# Patient Record
Sex: FEMALE | Race: WHITE | Employment: FULL TIME
[De-identification: names, ages, dates, MRNs, and addresses within clinical notes are randomized per-mention and may not be internally consistent; named-entity substitution may affect disease eponyms.]

---

## 2017-06-24 ENCOUNTER — HEALTH MAINTENANCE LETTER (OUTPATIENT)
Age: 37
End: 2017-06-24

## 2018-01-04 ENCOUNTER — TRANSFERRED RECORDS (OUTPATIENT)
Dept: HEALTH INFORMATION MANAGEMENT | Facility: CLINIC | Age: 38
End: 2018-01-04

## 2018-05-15 ENCOUNTER — TRANSFERRED RECORDS (OUTPATIENT)
Dept: HEALTH INFORMATION MANAGEMENT | Facility: CLINIC | Age: 38
End: 2018-05-15

## 2019-01-28 ENCOUNTER — OFFICE VISIT (OUTPATIENT)
Dept: FAMILY MEDICINE | Facility: CLINIC | Age: 39
End: 2019-01-28
Payer: COMMERCIAL

## 2019-01-28 VITALS
TEMPERATURE: 97 F | OXYGEN SATURATION: 98 % | BODY MASS INDEX: 26.29 KG/M2 | HEIGHT: 64 IN | SYSTOLIC BLOOD PRESSURE: 124 MMHG | WEIGHT: 154 LBS | HEART RATE: 69 BPM | DIASTOLIC BLOOD PRESSURE: 80 MMHG

## 2019-01-28 DIAGNOSIS — F10.11 HISTORY OF ALCOHOL ABUSE: ICD-10-CM

## 2019-01-28 DIAGNOSIS — G47.33 OSA (OBSTRUCTIVE SLEEP APNEA): ICD-10-CM

## 2019-01-28 DIAGNOSIS — N20.0 KIDNEY STONE: ICD-10-CM

## 2019-01-28 DIAGNOSIS — G43.109 MIGRAINE WITH AURA AND WITHOUT STATUS MIGRAINOSUS, NOT INTRACTABLE: ICD-10-CM

## 2019-01-28 DIAGNOSIS — N83.209 CYST OF OVARY, UNSPECIFIED LATERALITY: ICD-10-CM

## 2019-01-28 DIAGNOSIS — Z76.89 ENCOUNTER TO ESTABLISH CARE WITH NEW DOCTOR: Primary | ICD-10-CM

## 2019-01-28 DIAGNOSIS — G47.419 PRIMARY NARCOLEPSY WITHOUT CATAPLEXY: ICD-10-CM

## 2019-01-28 PROCEDURE — 99203 OFFICE O/P NEW LOW 30 MIN: CPT | Performed by: INTERNAL MEDICINE

## 2019-01-28 RX ORDER — DIHYDROERGOTAMINE MESYLATE 1 MG/ML
.5-1 INJECTION, SOLUTION INTRAMUSCULAR; INTRAVENOUS; SUBCUTANEOUS
COMMUNITY
Start: 2014-11-24

## 2019-01-28 RX ORDER — METHYLPHENIDATE HYDROCHLORIDE 10 MG/1
10 TABLET ORAL 2 TIMES DAILY
COMMUNITY
End: 2019-01-28

## 2019-01-28 RX ORDER — KETOCONAZOLE 20 MG/ML
SHAMPOO TOPICAL
COMMUNITY
Start: 2016-09-27

## 2019-01-28 RX ORDER — METHYLPHENIDATE HYDROCHLORIDE 10 MG/1
TABLET ORAL
Qty: 128 TABLET | Refills: 0 | Status: SHIPPED | OUTPATIENT
Start: 2019-01-28 | End: 2019-02-27

## 2019-01-28 RX ORDER — DEXTROAMPHETAMINE SACCHARATE, AMPHETAMINE ASPARTATE MONOHYDRATE, DEXTROAMPHETAMINE SULFATE AND AMPHETAMINE SULFATE 5; 5; 5; 5 MG/1; MG/1; MG/1; MG/1
20 CAPSULE, EXTENDED RELEASE ORAL
COMMUNITY
Start: 2018-12-15 | End: 2019-01-28

## 2019-01-28 RX ORDER — HYDROCORTISONE 2.5 %
CREAM (GRAM) TOPICAL
COMMUNITY
Start: 2016-09-27

## 2019-01-28 RX ORDER — DEXTROAMPHETAMINE SACCHARATE, AMPHETAMINE ASPARTATE MONOHYDRATE, DEXTROAMPHETAMINE SULFATE AND AMPHETAMINE SULFATE 5; 5; 5; 5 MG/1; MG/1; MG/1; MG/1
20 CAPSULE, EXTENDED RELEASE ORAL EVERY MORNING
Qty: 30 CAPSULE | Refills: 0 | Status: SHIPPED | OUTPATIENT
Start: 2019-01-28 | End: 2019-02-27

## 2019-01-28 RX ORDER — ONDANSETRON 4 MG/1
4 TABLET, ORALLY DISINTEGRATING ORAL
COMMUNITY
Start: 2018-12-21

## 2019-01-28 RX ORDER — FLUOCINONIDE 0.5 MG/G
CREAM TOPICAL
COMMUNITY
Start: 2016-03-10

## 2019-01-28 ASSESSMENT — MIFFLIN-ST. JEOR: SCORE: 1348.54

## 2019-01-28 NOTE — PROGRESS NOTES
"  SUBJECTIVE:   Destini Garcia is a 39 year old female who presents to clinic today for the following health issues:    Destini is here to establish care.  She has been following at [facility] system for years, but needs to transfer due to change of insurance.  Her main concerns today are refills of her stimulants for narcolepsy and referrals.    Narcolepsy without cataplexy -she is taking Adderall X are 20 mg daily along with Ritalin 10 mg 4 times daily and occasionally as needed.  She is followed with pulmonology for her narcolepsy.    GERONIMO -mild, does not use a CPAP anymore.  Also has followed with pulmonology for this.    Migraine -has not seen her neurologist in quite some time.  In the past she has used ergotamine and Toradol injections and Zofran.  Needs new referral.    Kidney stones -was seen recently in the ER and diagnosed with kidney stones.  She did pass this and has it at home, needs to bring it in for analysis.  She has had similar symptoms to those that brought her to the ER, but had never been officially diagnosed with kidney stones in the past.    Ovarian cyst -imaging in the ER revealed an ovarian cyst, she would like a referral to OB/GYN.  She is also overdue for Pap smear.     Mental health -history of anxiety and bipolar 2 disorder.  She is not currently on any specific medications for those.      SH: lives with her mother.  Works at [employer] in [employer].         Reviewed and updated as needed this visit by clinical staff  Tobacco  Allergies  Meds  Med Hx  Surg Hx  Fam Hx  Soc Hx      Reviewed and updated as needed this visit by Provider  Tobacco  Meds  Med Hx  Surg Hx  Fam Hx  Soc Hx        ROS:  Const, HEENT, pulm, neuro,  reviewed, she reports some left sided ear pain, otherwise negative unless noted above.       OBJECTIVE:     /80   Pulse 69   Temp 97  F (36.1  C)   Ht 1.626 m (5' 4\")   Wt 69.9 kg (154 lb)   SpO2 98%   BMI 26.43 kg/m    Body mass index is 26.43 " kg/m .    Gen: well appearing, pleasant woman, no distress  HEENT: PERRL, sclera nonicteric, ear canals and TM normal b/l. MMM  Pulm: breathing comfortably, CTAB, no wheezes or rales  CV: RRR, normal S1 and S2, no murmurs  Psych: normal affect, linear, appropriate       ASSESSMENT/PLAN:       1. Encounter to establish care with new doctor    2. Primary narcolepsy without cataplexy  Confirmed medication with last pulmonary note in care everywhere.  New referral placed.   - amphetamine-dextroamphetamine (ADDERALL XR) 20 MG 24 hr capsule; Take 1 capsule (20 mg) by mouth every morning  Dispense: 30 capsule; Refill: 0  - methylphenidate (RITALIN) 10 MG tablet; QID and prn  Dispense: 128 tablet; Refill: 0  - PULMONARY MEDICINE REFERRAL    3. Migraine with aura and without status migrainosus, not intractable  Referral placed.   - NEUROLOGY ADULT REFERRAL    4. GERONIMO (obstructive sleep apnea)  - PULMONARY MEDICINE REFERRAL    5. Kidney stone  She can bring the stone to our lab for analysis   - Stone analysis; Future    6. Cyst of ovary, unspecified laterality  - OB/GYN REFERRAL    F/U - 3 months for E     Zahra Cho MD  [facility]

## 2019-02-26 DIAGNOSIS — G47.419 PRIMARY NARCOLEPSY WITHOUT CATAPLEXY: ICD-10-CM

## 2019-02-26 NOTE — TELEPHONE ENCOUNTER
Patient calling in requesting a refill on her amphetamine-dextroamphetamine (ADDERALL XR) 20 MG 24 hr capsule and methylphenidate (RITALIN) 10 MG tablet patient states she will  at the  when ready. PT states she has enough for tomorrow then she is completely out, pt advised of 72 business hours prescriptions could take to be refilled, pt stated she understands. When prescriptions have been signed please call the patient to let her know they will be at the  to be picked up. [phone] okay to leave a detailed vm.      .Zabrina BRANCH    [facility]

## 2019-02-26 NOTE — TELEPHONE ENCOUNTER
I can fill this until she gets an appointment with specialist for narcolepsy, but I don't see that there are any future appointments made.  Does she need any assistance making pulm and neuro appointments?    Zahra Cho MD

## 2019-02-26 NOTE — TELEPHONE ENCOUNTER
Patient returned call- states that she must have misunderstood Dr Cho regarding the appts -  They have not called her yet and she will be out of her medications tomorrow.  Gave patient the number for Pulmonology from the referral on the AVS- advised to call today and get this appointment scheduled.  Tried to give patient the numbers for Neurology and OB-GYN- patient states the only important one is the pulmonologist because that is for Narcolepsy   She will call back with the appointment date and time    [facility] Anurag Carr [phone]

## 2019-02-26 NOTE — TELEPHONE ENCOUNTER
Left a non detailed message for patient to return call.     Mary ROBLERON, RN   [facility]

## 2019-02-26 NOTE — TELEPHONE ENCOUNTER
adderall      Last Written Prescription Date:  1/28/19  Last Fill Quantity: 30,   # refills: 0  Last Office Visit: 1/28/19  Future Office visit:       ritalin      Last Written Prescription Date:  1/28/19  Last Fill Quantity: 128,   # refills: 0  Last Office Visit: 1/28/19  Future Office visit:       Routing refill request to provider for review/approval because:  Drug not on the [facility], UMP or  Health refill protocol or controlled substance    RX [place] reviewed:  reviewed- recommend provider review    [place]  [email or web address]

## 2019-02-27 ENCOUNTER — TELEPHONE (OUTPATIENT)
Dept: FAMILY MEDICINE | Facility: CLINIC | Age: 39
End: 2019-02-27

## 2019-02-27 PROCEDURE — 82365 CALCULUS SPECTROSCOPY: CPT | Mod: 90 | Performed by: INTERNAL MEDICINE

## 2019-02-27 PROCEDURE — 99000 SPECIMEN HANDLING OFFICE-LAB: CPT | Performed by: INTERNAL MEDICINE

## 2019-02-27 RX ORDER — DEXTROAMPHETAMINE SACCHARATE, AMPHETAMINE ASPARTATE MONOHYDRATE, DEXTROAMPHETAMINE SULFATE AND AMPHETAMINE SULFATE 5; 5; 5; 5 MG/1; MG/1; MG/1; MG/1
20 CAPSULE, EXTENDED RELEASE ORAL EVERY MORNING
Qty: 30 CAPSULE | Refills: 0 | Status: SHIPPED | OUTPATIENT
Start: 2019-02-27 | End: 2019-10-29

## 2019-02-27 RX ORDER — METHYLPHENIDATE HYDROCHLORIDE 10 MG/1
TABLET ORAL
Qty: 128 TABLET | Refills: 0 | Status: SHIPPED | OUTPATIENT
Start: 2019-02-27 | End: 2019-10-29

## 2019-02-27 NOTE — TELEPHONE ENCOUNTER
Reason for Call:  Other prescription    Detailed comments:   *PT last day of meds today*    PT needs refill for     amphetamine-dextroamphetamine (ADDERALL XR) 20 MG 24 hr capsule  AND  methylphenidate (RITALIN) 10 MG tablet    She made apt with [facility] for 3/13/19.    *please give pt call when ready to *     Would like mother to  RX:  Jessa Mckoy  [birth year]      Phone Number Patient can be reached at: Cell number on file:    Telephone Information:   Mobile [phone]       Best Time: any    Can we leave a detailed message on this number? YES    Call taken on 2/27/2019 at 9:39 AM by Adwoa Dubois

## 2019-02-27 NOTE — TELEPHONE ENCOUNTER
Routing to WALLY Brandt.  Looks like Dr. Cho printed the rxs for adderall xr 20 mg and methylphenidate 10 mg today.  Please see note below.    Jeri SHARMA RN  EP Triage

## 2019-02-27 NOTE — TELEPHONE ENCOUNTER
Prescriptions for adderall and ritalin at the  for  and the pt was notified. Verbal consent given for mom Jessa Mckoy to .  Karly Ohara,

## 2019-02-27 NOTE — TELEPHONE ENCOUNTER
Controlled picked up by MOM on 02/27/2019.  Miriam Vásquez  Patient Representative

## 2019-03-27 ENCOUNTER — TRANSFERRED RECORDS (OUTPATIENT)
Dept: HEALTH INFORMATION MANAGEMENT | Facility: CLINIC | Age: 39
End: 2019-03-27

## 2019-04-04 ENCOUNTER — OFFICE VISIT (OUTPATIENT)
Dept: FAMILY MEDICINE | Facility: CLINIC | Age: 39
End: 2019-04-04
Payer: COMMERCIAL

## 2019-04-04 VITALS
HEART RATE: 90 BPM | WEIGHT: 150 LBS | DIASTOLIC BLOOD PRESSURE: 82 MMHG | BODY MASS INDEX: 25.75 KG/M2 | OXYGEN SATURATION: 97 % | SYSTOLIC BLOOD PRESSURE: 136 MMHG | TEMPERATURE: 98.1 F

## 2019-04-04 DIAGNOSIS — M62.830 BACK MUSCLE SPASM: ICD-10-CM

## 2019-04-04 DIAGNOSIS — G89.29 CHRONIC RIGHT-SIDED LOW BACK PAIN WITH RIGHT-SIDED SCIATICA: Primary | ICD-10-CM

## 2019-04-04 DIAGNOSIS — N20.0 KIDNEY STONE: ICD-10-CM

## 2019-04-04 DIAGNOSIS — M54.41 CHRONIC RIGHT-SIDED LOW BACK PAIN WITH RIGHT-SIDED SCIATICA: Primary | ICD-10-CM

## 2019-04-04 PROCEDURE — 99213 OFFICE O/P EST LOW 20 MIN: CPT | Performed by: NURSE PRACTITIONER

## 2019-04-04 RX ORDER — PREDNISONE 20 MG/1
40 TABLET ORAL DAILY
Qty: 10 TABLET | Refills: 0 | Status: SHIPPED | OUTPATIENT
Start: 2019-04-04 | End: 2019-04-09

## 2019-04-04 RX ORDER — CYCLOBENZAPRINE HCL 5 MG
5-10 TABLET ORAL 3 TIMES DAILY PRN
Qty: 60 TABLET | Refills: 0 | Status: SHIPPED | OUTPATIENT
Start: 2019-04-04

## 2019-04-04 NOTE — PROGRESS NOTES
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[employer], standing, bending     Description:   Location of pain: low back right  Character of pain: sharp  Pain radiation:radiates into the right buttocks  New numbness or weakness in legs, not attributed to pain:  no     Intensity: mild, moderate, severe    History:   Pain interferes with job: YES  History of back problems: recurrent self limited episodes of low back pain and in the neck in the past  Any previous MRI or X-rays: None  Sees a specialist for back pain:  No  Therapies tried without relief: lidocaine patches and OTC cream, chiropractor, cold, heat, NSAIDs and Physical Therapy, percocet  - Historically *    Alleviating factors:   Improved by: rest      Precipitating factors:  Worsened by: Lifting, Bending, Standing, Sitting, Lying Flat, Walking and Coughing      Accompanying Signs & Symptoms:  Risk of Fracture:  None  Risk of Cauda Equina:  None  Risk of Infection:  None  Risk of Cancer:  None  Risk of Ankylosing Spondylitis:  Onset at age <35, male, AND morning back stiffness. no     HPI: Destini presents today with the complaint of exacerbation of chronic low back pain.  At baseline, she has progressively worsening scoliosis.  She used to be seen regularly for this, but she has not in quite some time.  Over the past 5 months or so, her chronic back pain has intensified greatly.  She notes that over the past few days to weeks, it has become quite severe.  She denies red flag symptoms of cauda equina syndrome or other neurosurgical emergency.  She also denies limb weakness, as well as numbness and tingling.  She notes that most of her pain is in her right lumbar region with pain radiating down through her right buttock and sometimes  into her right posterior thigh.  She has tried many different modalities for this over the years.  She states that getting up from sitting to standing position causes her the most pain, and lying flat on her back is also quite problematic for her.  She is interested in reestablishing care with a medical spine specialist.    Problem list and histories reviewed & adjusted, as indicated.  Additional history: as documented    Reviewed and updated as needed this visit by clinical staff  Tobacco  Allergies  Meds  Problems  Med Hx  Surg Hx  Fam Hx       Reviewed and updated as needed this visit by Provider  Tobacco  Allergies  Meds  Problems  Med Hx  Surg Hx  Fam Hx         ROS:  Constitutional,  musculoskeletal, neuro systems are negative, except as otherwise noted.    OBJECTIVE:                                                      /82 (BP Location: Left arm, Patient Position: Chair, Cuff Size: Adult Regular)   Pulse 90   Temp 98.1  F (36.7  C) (Tympanic)   Wt 68 kg (150 lb)   LMP 03/05/2019   SpO2 97%   BMI 25.75 kg/m   Body mass index is 25.75 kg/m .   GENERAL: healthy, alert, well nourished, well hydrated, no distress  NECK: no tenderness, no adenopathy, no asymmetry, no masses, no stiffness; thyroid- normal to palpation  MS: extremities- no gross deformities noted, no edema  NEURO: strength and tone- normal, sensory exam- grossly normal, mentation- intact, speech- normal, reflexes- symmetric  BACK: Multiple myofascial knots, all tender, along her right paraspinal region in her lumbar spine.  Gross malalignment of spine is noted.  Range of motion intact in all planes, though very painful for her to perform maneuvers.    Diagnostic test results:  none     ASSESSMENT/PLAN:                                                      Destini was seen today for back pain.  Will order medical spine specialty referral today given the recent intensification of her chronic back pain symptoms.  In the short-term,  will order 5-day burst of prednisone, as well as cyclobenzaprine for intense back spasms.  Suggest continuing ibuprofen, as well as alternation of ice and heat for the time being.  Recommend gentle stretch and range of motion.  Suggested continuing lidocaine gel, as well.  She agrees to schedule with sports medicine in the coming days.  We will reach out if she needs help scheduling this. Discussed reasons to call or return to clinic. Destini acknowledges and demonstrates understanding of circumstances under which care should be sought urgently or emergently. Follow up as discussed. Discussed risks, benefits, alternatives, potential side effects, and proper administration of new medication / treatment. Agrees with plan of care. All questions answered.     Diagnoses and all orders for this visit:    Chronic right-sided low back pain with right-sided sciatica  -     predniSONE (DELTASONE) 20 MG tablet; Take 40 mg by mouth daily for 5 days.  -     ORTHO  REFERRAL    Back muscle spasm  -     cyclobenzaprine (FLEXERIL) 5 MG tablet; Take 1-2 tablets (5-10 mg) by mouth 3 times daily as needed for muscle spasms      Risks, benefits and alternatives of treatments discussed. Plan agreed upon and all questions answered.      Follow-Up: Return in about 2 weeks (around 4/18/2019) for persistent or worsening symptoms.    See Patient Instructions      CHARLIE Young, CNP

## 2019-04-04 NOTE — LETTER
[address] [address]  Phone: [phone]    April 4, 2019        Destini Garcia  [address] [place] [id number]          To whom it may concern:    RE: Destini Garcia    Patient was seen and treated today at our clinic. Please excuse her absence on 4/03/19.    Please contact me for questions or concerns.      Sincerely,        Rl Anderson NP

## 2019-04-04 NOTE — PATIENT INSTRUCTIONS
Low back pain:  No need for imaging at this point (unless you have red flag symptoms).  Keep active, as able. Resting a sore back too much can delay recovery.  Ibuprofen or Tylenol for pain.  Ice for pain. Heat to relieve spasm and tightness.  Muscle relaxants help sometimes, but it's important not to drive after taking these.  5 day course of prednisone  Call or return to clinic if your symptoms persist for 6 weeks, or if they worsen.    See spine specialist ASAP

## 2019-04-07 LAB
APPEARANCE STONE: NORMAL
COMPN STONE: NORMAL
NUMBER STONE: 1
SIZE STONE: NORMAL MM
WT STONE: 34 MG

## 2019-04-22 ENCOUNTER — OFFICE VISIT (OUTPATIENT)
Dept: PALLIATIVE MEDICINE | Facility: CLINIC | Age: 39
End: 2019-04-22
Payer: COMMERCIAL

## 2019-04-22 VITALS — HEART RATE: 83 BPM | SYSTOLIC BLOOD PRESSURE: 151 MMHG | OXYGEN SATURATION: 100 % | DIASTOLIC BLOOD PRESSURE: 95 MMHG

## 2019-04-22 DIAGNOSIS — M54.16 LUMBAR RADICULOPATHY: Primary | ICD-10-CM

## 2019-04-22 DIAGNOSIS — M79.18 MYOFASCIAL MUSCLE PAIN: ICD-10-CM

## 2019-04-22 PROCEDURE — 99204 OFFICE O/P NEW MOD 45 MIN: CPT | Performed by: PAIN MEDICINE

## 2019-04-22 RX ORDER — DICLOFENAC SODIUM 75 MG/1
75 TABLET, DELAYED RELEASE ORAL 2 TIMES DAILY PRN
Qty: 60 TABLET | Refills: 1 | Status: SHIPPED | OUTPATIENT
Start: 2019-04-22 | End: 2020-06-30

## 2019-04-22 RX ORDER — DIAZEPAM 5 MG
5 TABLET ORAL ONCE
Qty: 1 TABLET | Refills: 0 | Status: SHIPPED | OUTPATIENT
Start: 2019-04-22 | End: 2019-04-22

## 2019-04-22 ASSESSMENT — PAIN SCALES - GENERAL: PAINLEVEL: MODERATE PAIN (4)

## 2019-04-22 NOTE — PATIENT INSTRUCTIONS
- Further procedures recommended:    - consider epidural pending results of MRI    - consider Trigger point injection    - Medication Management:    - would stop motrin and start Diclofenac 75mg twice a day as needed   - consider changing flexeril to zanaflex to see if she has less side effects     - Physical Therapy: Will order [facility]      - Diagnostic Studies: ordered MRI lumbar spine. Call [phone]. Prescribed 1 valium to be taken 30-45min before MRI. Patient confirmed that she will have a      - Follow up: will call with the results of the MRI and discuss plan on how to proceed      ----------------------------------------------------------------  Clinic Number:  [phone]   Call this number with any questions about your care and for scheduling assistance. Calls are returned Monday through Friday between 8 AM and 4:30 PM. We usually get back to you within 2 business days depending on the issue/request.       Medication refills:    For non-opioid medications, call your pharmacy directly to request a refill. The pharmacy will contact the [facility] for authorization. Please allow 3-4 days for these refills to be processed.     For opioid refills, call the clinic number or send a [employer] message. Please contact us 7-10 days before your refill is due. The message MUST include the name of the specific medication(s) requested and how you would like to receive the prescription(s). The options are as follows:    Pain Clinic staff can mail the prescription to your pharmacy. Please tell us the name of the pharmacy.    You may pick the prescription up at the Pain Clinic (tell us the location) or during a clinic visit with your pain provider    Pain Clinic staff can deliver the prescription to the [facility] pharmacy in the clinic building. Please tell us the location.      We believe regular attendance is key to your success in our program.    Any time you are unable to keep your  appointment we ask that you call us at least 24 hours in advance to let us know. This will allow us to offer the appointment time to another patient.

## 2019-04-22 NOTE — PROGRESS NOTES
[facility] med-spine consultation    Date of visit: 4/22/2019    Reason for consultation:    Primary Care Provider is Zahra Cho.  Pain medications are being prescribed by n/a.    Please see the [facility] health questionnaire which the patient completed and reviewed with me in detail.    Chief Complaint:    Chief Complaint   Patient presents with     Pain     MME prescribed prior to seeing patient:  Current MME:    Pain history:  Destini Garcia is a 39 year old female history of scoliosis and narcolepsy with pain acute on chronic Right-sided low back pain.  Of note the pain has gotten worse over the last 5-6 months. The pt symptoms initially got worse after the birth of her children >17yrs ago.  Of note the pt recently started to work at a restaurant, which she feels has worsend her symptoms.  Currently, the pain radiates to her right lower extremity/buttocks.  The pain is constant, but is intermittently sharp shooting.She denies any numbness/tingling/burning.  The pain is worse when going from a seated to standing position.  The pain is worse with bending.  The pain is worse with prolonged standing.  The pain is worse on lying flat on her back.  The patient notes significant pain while trying to mop at work.   She notes some relief with Motrin.  She notes mild relief with ice/heat. She notes sig benefit with lido patches.   She denies any incontinence.  She denies any overt progressive weakness.  She denies any recent falls.  Pain rating: intensity  Averages 4/10 on a 0-10 scale.      Current treatments include:  She did not start medrol dose pack 2/2 to concern of interaction with narcolepsy meds  Motrin  Medrol Dosepak-did not take  Flexeril- can only take at night so not taking very often   Adderall and Ritalin  Previous medication treatments included:  Tramadol- benefit  nabumetone  Other treatments have included:  Destini Garcia has been seen at a pain clinic in the past.  She  believes [employer] where she had a cervical.  She is no longer having neck pain at this time  PT: benefit  Chiropractic: benefit  Acupuncture: no  TENs Unit: benefit  Injections: CLARY benefit     Past Medical History:  Past Medical History:   Diagnosis Date     Allergic rhinitis      Asthma      Genital herpes      Migraines      Narcolepsy      GERONIMO (obstructive sleep apnea)      Past Surgical History:  Past Surgical History:   Procedure Laterality Date     NO HISTORY OF SURGERY       Medications:  Current Outpatient Medications   Medication Sig Dispense Refill     acyclovir (ZOVIRAX) 400 MG tablet Take 1 tablet by mouth 3 times daily. 1 tablet by mouth 3 times daily   15 tablet 5     albuterol (PROAIR HFA, PROVENTIL HFA, VENTOLIN HFA) 108 (90 BASE) MCG/ACT inhaler Inhale 2 puffs into the lungs every 6 hours       amphetamine-dextroamphetamine (ADDERALL XR) 20 MG 24 hr capsule Take 1 capsule (20 mg) by mouth every morning 30 capsule 0     cyclobenzaprine (FLEXERIL) 5 MG tablet Take 1-2 tablets (5-10 mg) by mouth 3 times daily as needed for muscle spasms 60 tablet 0     diazepam (VALIUM) 5 MG tablet Take 1 tablet (5 mg) by mouth once for 1 dose 1 tablet 0     diclofenac (VOLTAREN) 75 MG EC tablet Take 1 tablet (75 mg) by mouth 2 times daily as needed for moderate pain 60 tablet 1     dihydroergotamine (D.H.E. 45) 1 MG/ML injection Inject 0.5-1 mg into the muscle       fluocinonide (LIDEX) 0.05 % external cream        fluticasone (FLONASE) 50 MCG/ACT nasal spray Spray 2 sprays into both nostrils daily       hydrocortisone 2.5 % cream Apply twice daily as needed to red scaly areas on the face.       ketoconazole (NIZORAL) 2 % external shampoo Three times weekly: Lather onto scalp and face, leave in place for 3-5 minutes, then rinse.       levonorgestrel (MIRENA, 52 MG,) 20 MCG/24HR IUD 1 each by Intrauterine route       loratadine (CLARITIN) 10 MG tablet Take 10 mg by mouth daily       methylphenidate (RITALIN) 10 MG  tablet QID and prn 128 tablet 0     ondansetron (ZOFRAN-ODT) 4 MG ODT tab Take 4 mg by mouth       Allergies:     Allergies   Allergen Reactions     Tramadol GI Disturbance     Social History:  Home situation: [facility]  Occupation/Schooling: yes  Tobacco use: yes  Alcohol use: no  Drug use: no  History of chemical dependency treatment: noo    Family history:  Family History   Problem Relation Age of Onset     Hypertension Mother      Depression Mother      Osteoporosis Mother      Gynecology Mother         Endometriosis     Heart Disease Father      Eye Disorder Father         Lazy Eye     Arthritis Maternal Grandmother      Eye Disorder Maternal Grandmother      Family history of headaches: no    Review of Systems:  Skin: negative  Eyes: negative  Ears/Nose/Throat: negative  Respiratory: No shortness of breath, dyspnea on exertion, cough, or hemoptysis  Cardiovascular: negative  Gastrointestinal: negative  Genitourinary: negative  Musculoskeletal: negative  Neurologic: negative  Psychiatric: negative  Hematologic/Lymphatic/Immunologic: negative  Endocrine: negative    Physical Exam:  Vitals:    04/22/19 1406   BP: (!) 151/95   Pulse: 83   SpO2: 100%     Exam:  Constitutional: healthy, alert and no distress  Head: normocephalic. Atraumatic.   Eyes: no redness or jaundice noted   ENT: oropharnx normal.  MMM.  Neck supple.    Cardiovascular: Negative JVD  Respiratory: Speaking in full sentences no accessory muscles use   gastrointestinal: soft, non-ten  Skin: no suspicious lesions or rashes  Psychiatric: mentation appears normal and affect normal/bright    Musculoskeletal exam:  Gait/Station/Posture: wnl  Cervical spine: ROMwnl       Thoracic spine:  Normal     Lumbar spine:     ROM: decreaed   Myofascial tenderness: Diffuse bilaterally right greater than left   Tracy's: Positive on the right               Straight leg exam: Equivocal on the right     Neurologic exam:  CN:  Cranial nerves 2-12 are normal  Motor:  5/5 UE and  LE strength    Of note right shoulder exam positive Ely, positive Neer's, positive empty can positive pain with external rotation    Reflexes:     Biceps:     +2      Achilles:  +2    Sensory:  (upper and lower extremities):   Light touch: normal    Allodynia: absent   Dysethesia: absent    Hyperalgesia: absent     Diagnostic tests:  MRI            D.I.R.E Score: Patient Selection for Chronic Opioid Analgesia    For each factor, rate the patient's score from 1 - 3 based on the explanations on the right.       Diagnosis             2         1 = Benign chronic condition with minimal objective findings or no definite medical diagnosis.  Examples:  fibromyalgia, migraine, headaches, non-specific back pain.  2 = Slowly progressive condition concordant with moderate pain, or fixed condition with moderate objective findings.  Examples: failed back surgery syndrome, back pain with moderate degenerative changes, neuropathic pain.  3 = Advanced condition concordant with severe pain with objective findings.  Examples: severe ischemic vascular disease, advanced neuropathy, severe spinal stenosis.    Intractability             2         1 = Few therapies have been tried and the patient takes a passive role in his/her pain management process.   2 = Most costomary treatments have been tried but the patient is not fully engaged in the pain management process, or barriers prevent (insurance, transportation, medical illness)  3 = Patient fully engaged in a spectrum of appropriate treatments but with inadequate response.    Risk   (Risk = Total of P+C+R+S below)       Psychological             2         1 = Serious personality dysfunction or mental illness interfering with care.  Examples: personality disorder, severe affective disorder, significant personality issues.  2 = Personality or mental health interferes moderately.  Example: depression or anxiety disorder.  3 = Good communication with the clinic.  No significant  personality dysfunction or mental illness.       Chemical      Health             2         1 = Active or very recent use of illicit drugs, excessive alcohol, or prescription drug abuse.  2 = Chemical coper (uses medications to cope with stress) or history of chemical dependency in remission.  3 = No CD history.  Not drug-focused or chemically reliant       Reliability             2         1 = History of numerous problems: medication misuse, missed appointments, rarely follows through.  2 = Occasional difficulties with compliance, but generally reliable.  3 = Highly reliable patient with medications, appointments and treatment.       Social      Support             2         1= Life in chaos.  Little family support and few close relationships.  Loss of most normal life roles.  2 = Reduction in some relationships and life roles.  3 = Supportive family/close relationships.  Involved in work or school and no social isolation.    Efficacy score             2         1 = Poor function or minimal pain relief despite moderate to high doses.  2 = Moderate benefit with function improved in a number of ways (or insufficient info - hasn't tried opioid yet or very low doses or too short a trial.  3 = Good improvement in pain and function and quality of life with stable doses over time.                                    14    Total score = D + I + R + E    Score 7-13: Not a suitable candidate for long-term opioid analgesia  Score 14-21: May be a good candidate      Assessment/Plan:  Dsetini Garcia is a 39 year old female who presents with the complaints of bilateral low back pain radiating to her right lower extremity.   Destini was seen today for pain.    Diagnoses and all orders for this visit:    Lumbar radiculopathy  -     [facility] REFERRAL; Future  -     MR Lumbar Spine w/o Contrast; Future  -     diazepam (VALIUM) 5 MG tablet; Take 1 tablet (5 mg) by mouth once for 1 dose    Myofascial muscle pain  -      diclofenac (VOLTAREN) 75 MG EC tablet; Take 1 tablet (75 mg) by mouth 2 times daily as needed for moderate pain         - Further procedures recommended:    - consider epidural pending results of MRI    - consider Trigger point injection    - Medication Management:    - would stop motrin and start Diclofenac 75mg twice a day as needed   - consider changing flexeril to zanaflex to see if she has less side effects     - Physical Therapy: Will order [facility]      - Diagnostic Studies: ordered MRI lumbar spine. Call [phone]. Prescribed 1 valium to be taken 30-45min before MRI. Patient confirmed that she will have a      - Follow up: will call with the results of the MRI and discuss plan on how to proceed          Total time spent was 45 minutes, and more than 50% of face to face time was spent counseling and/or coordination of care regarding principles of multidisciplinary care and medication management bilateral low back pain radiating to her right lower    Osmar Smith MD  [facility]  This note was created with voice recognition software, and while reviewed for accuracy, typos may remain.

## 2019-04-26 ENCOUNTER — HOSPITAL ENCOUNTER (OUTPATIENT)
Dept: MRI IMAGING | Facility: CLINIC | Age: 39
Discharge: HOME | End: 2019-04-26
Attending: PAIN MEDICINE | Admitting: PAIN MEDICINE
Payer: COMMERCIAL

## 2019-04-26 DIAGNOSIS — M54.16 LUMBAR RADICULOPATHY: ICD-10-CM

## 2019-04-26 PROCEDURE — 72148 MRI LUMBAR SPINE W/O DYE: CPT

## 2019-04-29 ENCOUNTER — TELEPHONE (OUTPATIENT)
Dept: PALLIATIVE MEDICINE | Facility: CLINIC | Age: 39
End: 2019-04-29

## 2019-04-29 NOTE — TELEPHONE ENCOUNTER
Please call patient to give results of her MRI.  Most significant findings is significant arthritis at L4-5, L5-S1.  I feel this is playing significant role in her axial low back pain.  If interested would recommend a facet joint injection to alleviate some of her right-sided low back pain    L1-L2: There is mild facet arthropathy bilaterally. There is no spinal  canal or neural foraminal stenosis at this level.     L2-L3: There is minimal facet arthropathy bilaterally. There is no  spinal canal or neural foraminal stenosis at this level.     L3-L4: There is moderate facet arthropathy bilaterally. There is no  spinal canal or neural foraminal stenosis at this level.     L4-L5: There is severe facet arthropathy bilaterally. There is a 1.0  cm synovial cyst arising from the anteromedial aspect of the left  facet joint. These findings result in minimal spinal canal narrowing  and mild left foraminal narrowing but no right foraminal stenosis.     L5-S1: There is moderate facet arthropathy bilaterally. There is no  spinal canal or neural foraminal stenosis at this level.                                                                      IMPRESSION:  1. Bone marrow edema in the bilateral pedicles of L5 possibly  representing a stress reaction or related to facet arthropathy  bilaterally at the L4-L5 level.  2. Questionable bilateral pars interarticularis defects of L5.  3. 1 cm synovial cyst arising from the left facet joint at the L4-L5  level resulting in minimal spinal canal narrowing.  4. Scattered facet arthropathy of the lumbar spine as described above.

## 2019-05-01 NOTE — TELEPHONE ENCOUNTER
Called pt and relayed all of Dr. Smith's response.  She states her insurance lapsed yesterday for some reason but she has resubmitted her application.  She will call to schedule when her insurance is up to date.    Kianna Payan RN-BSN  [facility]

## 2019-08-09 ENCOUNTER — TELEPHONE (OUTPATIENT)
Dept: FAMILY MEDICINE | Facility: CLINIC | Age: 39
End: 2019-08-09

## 2019-08-09 DIAGNOSIS — B00.9 HERPES SIMPLEX TYPE 2 INFECTION: ICD-10-CM

## 2019-08-09 RX ORDER — ACYCLOVIR 400 MG/1
400 TABLET ORAL 3 TIMES DAILY
Qty: 15 TABLET | Refills: 5 | Status: SHIPPED | OUTPATIENT
Start: 2019-08-09 | End: 2020-06-23

## 2019-08-09 NOTE — TELEPHONE ENCOUNTER
Reason for Call:  Medication or medication refill:    Do you use a [facility]?  Name of the pharmacy and phone number for the current request:  [facility]    Name of the medication requested: acyclovir (ZOVIRAX) 400 MG tablet    Other request: Please fill for patient .    Can we leave a detailed message on this number? YES    Phone number patient can be reached at: Cell number on file:    Telephone Information:   Mobile [phone]       Best Time: any    Call taken on 8/9/2019 at 3:48 PM by Nathaly Michel

## 2019-08-09 NOTE — TELEPHONE ENCOUNTER
"Requested Prescriptions   Pending Prescriptions Disp Refills     acyclovir (ZOVIRAX) 400 MG tablet 15 tablet 5     Sig: Take 1 tablet (400 mg) by mouth 3 times daily 1 tablet by mouth 3 times daily   Last Written Prescription Date:  1/13/2011  Last Fill Quantity: 15,  # refills: 5   Last office visit: 4/4/2019 with prescribing provider:  Justin  Future Office Visit:        Antivirals for Herpes Protocol Failed - 8/9/2019  3:51 PM        Failed - Normal serum creatinine on file in past 12 months     Recent Labs   Lab Test 06/28/14  0742   CR 0.80             Passed - Patient is age 12 or older        Passed - Recent (12 mo) or future (30 days) visit within the authorizing provider's specialty     Patient had office visit in the last 12 months or has a visit in the next 30 days with authorizing provider or within the authorizing provider's specialty.  See \"Patient Info\" tab in inbasket, or \"Choose Columns\" in Meds & Orders section of the refill encounter.              Passed - Medication is active on med list        Routing refill request to provider for review/approval because:  Labs not current:  Cr  A break in medication    OSBALDO SalazarN, RN  [facility]          "

## 2019-10-02 ENCOUNTER — HEALTH MAINTENANCE LETTER (OUTPATIENT)
Age: 39
End: 2019-10-02

## 2019-10-25 ENCOUNTER — TELEPHONE (OUTPATIENT)
Dept: FAMILY MEDICINE | Facility: CLINIC | Age: 39
End: 2019-10-25

## 2019-10-25 NOTE — TELEPHONE ENCOUNTER
Reason for Call:  Medication or medication refill:    Do you use a [facility]?  Name of the pharmacy and phone number for the current request:  [facility]    Name of the medication requested: amphetamine-dextroamphetamine (ADDERALL XR) 20 MG 24 hr capsule, methylphenidate (RITALIN) 10 MG tablet    Other request: Pt stated her lungs specialist is original prescribing those medication but they told her she needs to be seen and she can not weight till she sees her specialist she needs to have medication on Sunday.    Can we leave a detailed message on this number? YES    Phone number patient can be reached at: Home number on file [phone] (home)    Best Time: anytime    Call taken on 10/25/2019 at 3:01 PM by Tracey Russ

## 2019-10-25 NOTE — TELEPHONE ENCOUNTER
Attempted to call patient to gather more information. Left a non-detailed message and call back number [phone].     Patient had these medications were last prescribed 02/27/19 (Ritalin and Adderall) by Dr. Cho. Patient has not been seen Dr. Cho since 1/28/19 for office visit.     Was seen by CHARLES Anderson on 4/4/19 for Chronic R. Sided low back pain with R. Sided sciatica.     Checked  of patient and she has been getting her Ritalin and Adderall from a different provider since 2/27/19.  She has been prescribed both medications once by a Dr. Edmund Ham on 3/27/19, but since then starting in April 2019 patient has been getting both medications prescribed by Emma Nuno.     Patient would need be by seen by a provider here at the clinic, otherwise she needs to contact Emma Nuno as she has be prescribing these medications for patient since April 2019. Patient is also requesting this medication on a Friday last minute. Refills for medications take 72 business hours.     Jia Helms RN, BSN  [facility]

## 2019-10-28 NOTE — TELEPHONE ENCOUNTER
2nd attempt.  Non detailed message left for pt to return call to clinic and ask to speak with a triage nurse.    Pt has not been seen in clinic since April by Prashant and January by Dr. Cho.  Will need to be seen or at schedule an appt to be seen and then will send message to md. Jeri SHARMA RN  EP Triage

## 2019-10-29 ENCOUNTER — OFFICE VISIT (OUTPATIENT)
Dept: FAMILY MEDICINE | Facility: CLINIC | Age: 39
End: 2019-10-29
Payer: COMMERCIAL

## 2019-10-29 VITALS
SYSTOLIC BLOOD PRESSURE: 154 MMHG | DIASTOLIC BLOOD PRESSURE: 92 MMHG | OXYGEN SATURATION: 99 % | HEART RATE: 74 BPM | RESPIRATION RATE: 16 BRPM | WEIGHT: 147 LBS | BODY MASS INDEX: 25.1 KG/M2 | HEIGHT: 64 IN | TEMPERATURE: 98.4 F

## 2019-10-29 DIAGNOSIS — G47.419 PRIMARY NARCOLEPSY WITHOUT CATAPLEXY: ICD-10-CM

## 2019-10-29 PROCEDURE — 99214 OFFICE O/P EST MOD 30 MIN: CPT | Performed by: FAMILY MEDICINE

## 2019-10-29 RX ORDER — METHYLPHENIDATE HYDROCHLORIDE 10 MG/1
TABLET ORAL
Qty: 120 TABLET | Refills: 0 | Status: SHIPPED | OUTPATIENT
Start: 2019-10-29

## 2019-10-29 RX ORDER — DEXTROAMPHETAMINE SACCHARATE, AMPHETAMINE ASPARTATE MONOHYDRATE, DEXTROAMPHETAMINE SULFATE AND AMPHETAMINE SULFATE 5; 5; 5; 5 MG/1; MG/1; MG/1; MG/1
20 CAPSULE, EXTENDED RELEASE ORAL EVERY MORNING
Qty: 30 CAPSULE | Refills: 0 | Status: SHIPPED | OUTPATIENT
Start: 2019-10-29

## 2019-10-29 RX ORDER — METHYLPHENIDATE HYDROCHLORIDE 10 MG/1
TABLET ORAL
Qty: 120 TABLET | Refills: 0 | Status: SHIPPED | OUTPATIENT
Start: 2019-10-29 | End: 2019-10-29

## 2019-10-29 ASSESSMENT — MIFFLIN-ST. JEOR: SCORE: 1316.79

## 2019-10-29 NOTE — PROGRESS NOTES
Subjective     Destini Garcia is a 39 year old female who presents to clinic today for the following health issues:    HPI   Medication Followup of Adderall, Ritalin     Taking Medication as prescribed: yes    Side Effects:  None    Medication Helping Symptoms:  yes     Patient Active Problem List   Diagnosis     Narcolepsy without cataplexy in conditions classified elsewhere     Bipolar affective disorder (H)     Generalized anxiety disorder     Herpes simplex type 2 infection     GERONIMO (obstructive sleep apnea)     Migraine with aura and without status migrainosus, not intractable     Genital herpes     History of alcohol abuse     Past Surgical History:   Procedure Laterality Date     NO HISTORY OF SURGERY         Social History     Tobacco Use     Smoking status: Current Every Day Smoker     Packs/day: 0.25     Years: 17.00     Pack years: 4.25     Types: Cigarettes     Smokeless tobacco: Never Used   Substance Use Topics     Alcohol use: Yes     Comment: Occasionaly     Family History   Problem Relation Age of Onset     Hypertension Mother      Depression Mother      Osteoporosis Mother      Gynecology Mother         Endometriosis     Heart Disease Father      Eye Disorder Father         Lazy Eye     Arthritis Maternal Grandmother      Eye Disorder Maternal Grandmother          Current Outpatient Medications   Medication Sig Dispense Refill     albuterol (PROAIR HFA, PROVENTIL HFA, VENTOLIN HFA) 108 (90 BASE) MCG/ACT inhaler Inhale 2 puffs into the lungs every 6 hours       amphetamine-dextroamphetamine (ADDERALL XR) 20 MG 24 hr capsule Take 1 capsule (20 mg) by mouth every morning 30 capsule 0     cyclobenzaprine (FLEXERIL) 5 MG tablet Take 1-2 tablets (5-10 mg) by mouth 3 times daily as needed for muscle spasms 60 tablet 0     diclofenac (VOLTAREN) 75 MG EC tablet Take 1 tablet (75 mg) by mouth 2 times daily as needed for moderate pain 60 tablet 1     dihydroergotamine (D.H.E. 45) 1 MG/ML injection Inject 0.5-1  "mg into the muscle       fluocinonide (LIDEX) 0.05 % external cream        fluticasone (FLONASE) 50 MCG/ACT nasal spray Spray 2 sprays into both nostrils daily       hydrocortisone 2.5 % cream Apply twice daily as needed to red scaly areas on the face.       ketoconazole (NIZORAL) 2 % external shampoo Three times weekly: Lather onto scalp and face, leave in place for 3-5 minutes, then rinse.       loratadine (CLARITIN) 10 MG tablet Take 10 mg by mouth daily       methylphenidate (RITALIN) 10 MG tablet QID and prn 120 tablet 0     ondansetron (ZOFRAN-ODT) 4 MG ODT tab Take 4 mg by mouth       acyclovir (ZOVIRAX) 400 MG tablet Take 1 tablet (400 mg) by mouth 3 times daily (Patient not taking: Reported on 10/29/2019) 15 tablet 5     levonorgestrel (MIRENA, 52 MG,) 20 MCG/24HR IUD 1 each by Intrauterine route       Allergies   Allergen Reactions     Tramadol GI Disturbance     Recent Labs   Lab Test 06/28/14  0742   ALT 35   CR 0.80   GFRESTIMATED 81   GFRESTBLACK >90   POTASSIUM 4.1   TSH 1.53      BP Readings from Last 3 Encounters:   10/29/19 (!) 154/92   04/22/19 (!) 151/95   04/04/19 136/82    Wt Readings from Last 3 Encounters:   10/29/19 66.7 kg (147 lb)   04/04/19 68 kg (150 lb)   01/28/19 69.9 kg (154 lb)             Reviewed and updated as needed this visit by Provider         Review of Systems   ROS COMP: Constitutional, HEENT, cardiovascular, pulmonary, gi and gu systems are negative, except as otherwise noted.      Objective    BP (!) 154/92 (Cuff Size: Adult Large)   Pulse 74   Temp 98.4  F (36.9  C) (Tympanic)   Resp 16   Ht 1.626 m (5' 4\")   Wt 66.7 kg (147 lb)   LMP 10/25/2019 (Approximate)   SpO2 99%   BMI 25.23 kg/m    Body mass index is 25.23 kg/m .  Physical Exam   GENERAL: healthy, alert and no distress  NECK: no adenopathy, no asymmetry, masses, or scars and thyroid normal to palpation  RESP: lungs clear to auscultation - no rales, rhonchi or wheezes  CV: regular rate and rhythm, normal S1 " "S2, no S3 or S4, no murmur, click or rub, no peripheral edema and peripheral pulses strong  ABDOMEN: soft, nontender, no hepatosplenomegaly, no masses and bowel sounds normal  MS: no gross musculoskeletal defects noted, no edema            Assessment & Plan       ICD-10-CM    1. Primary narcolepsy without cataplexy G47.419 amphetamine-dextroamphetamine (ADDERALL XR) 20 MG 24 hr capsule     methylphenidate (RITALIN) 10 MG tablet      has been taking the stimulants med from [facility] for her narcolepsy, unfortunately she has to renew the care with [facility] based on her insurance([employer]), was not able to do timely manner, will have her to keep on current dose of for now and encouraged her to see [facility] to get referral to [facility] for continuing care with them. I did not file her chronic controlled substance contracts due to the fact mentioned above. Pt ackonwelged and plan to see [facility] to sarah referral again to [facility] and continue care with them related with her Narcolepsy       Tobacco Cessation:   reports that she has been smoking cigarettes. She has a 4.25 pack-year smoking history. She has never used smokeless tobacco.        BMI:   Estimated body mass index is 25.23 kg/m  as calculated from the following:    Height as of this encounter: 1.626 m (5' 4\").    Weight as of this encounter: 66.7 kg (147 lb).       No follow-ups on file.    Jon Cabrera MD  [facility] [place]        "

## 2019-10-29 NOTE — TELEPHONE ENCOUNTER
Pt is scheduled with Dr. Cabrera today at 1:20 for controlled med check.    Jeri SHARMA RN  EP Triage

## 2020-06-22 DIAGNOSIS — B00.9 HERPES SIMPLEX TYPE 2 INFECTION: ICD-10-CM

## 2020-06-22 NOTE — TELEPHONE ENCOUNTER
Routing refill request to provider for review/approval because:  Labs not current:  Cr last done on 6/28/14    Jeri SHARMA RN  EP Triage

## 2020-06-23 RX ORDER — ACYCLOVIR 400 MG/1
TABLET ORAL
Qty: 15 TABLET | Refills: 4 | Status: SHIPPED | OUTPATIENT
Start: 2020-06-23

## 2020-06-30 DIAGNOSIS — M79.18 MYOFASCIAL MUSCLE PAIN: ICD-10-CM

## 2020-06-30 RX ORDER — DICLOFENAC SODIUM 75 MG/1
75 TABLET, DELAYED RELEASE ORAL 2 TIMES DAILY PRN
Qty: 60 TABLET | Refills: 1 | Status: SHIPPED | OUTPATIENT
Start: 2020-06-30

## 2020-06-30 NOTE — TELEPHONE ENCOUNTER
Received fax request from [facility] pharmacy requesting refill(s) for diclofenac (VOLTAREN) 75 MG EC tablet    Last refilled on 04/22/19    Pt last seen on 04/22/19  Next appt scheduled for : none    Will facilitate refill.

## 2021-01-15 ENCOUNTER — HEALTH MAINTENANCE LETTER (OUTPATIENT)
Age: 41
End: 2021-01-15

## 2021-09-05 ENCOUNTER — HEALTH MAINTENANCE LETTER (OUTPATIENT)
Age: 41
End: 2021-09-05

## 2022-02-20 ENCOUNTER — HEALTH MAINTENANCE LETTER (OUTPATIENT)
Age: 42
End: 2022-02-20

## 2022-10-23 ENCOUNTER — HEALTH MAINTENANCE LETTER (OUTPATIENT)
Age: 42
End: 2022-10-23

## 2023-04-01 ENCOUNTER — HEALTH MAINTENANCE LETTER (OUTPATIENT)
Age: 43
End: 2023-04-01